# Patient Record
Sex: MALE | Race: WHITE
[De-identification: names, ages, dates, MRNs, and addresses within clinical notes are randomized per-mention and may not be internally consistent; named-entity substitution may affect disease eponyms.]

---

## 2017-03-16 ENCOUNTER — RECORD ABSTRACTING (OUTPATIENT)
Age: 47
End: 2017-03-16

## 2017-03-16 DIAGNOSIS — Z78.9 OTHER SPECIFIED HEALTH STATUS: ICD-10-CM

## 2017-03-16 DIAGNOSIS — Z80.7 FAMILY HISTORY OF OTHER MALIGNANT NEOPLASMS OF LYMPHOID, HEMATOPOIETIC AND RELATED TISSUES: ICD-10-CM

## 2017-03-16 RX ORDER — CLONAZEPAM 1 MG/1
1 TABLET ORAL
Refills: 0 | Status: ACTIVE | COMMUNITY

## 2017-03-30 ENCOUNTER — APPOINTMENT (OUTPATIENT)
Dept: INTERNAL MEDICINE | Facility: CLINIC | Age: 47
End: 2017-03-30

## 2017-03-30 VITALS
DIASTOLIC BLOOD PRESSURE: 80 MMHG | BODY MASS INDEX: 26.07 KG/M2 | SYSTOLIC BLOOD PRESSURE: 104 MMHG | WEIGHT: 176 LBS | HEIGHT: 69 IN | OXYGEN SATURATION: 98 % | HEART RATE: 70 BPM | TEMPERATURE: 98.2 F | RESPIRATION RATE: 16 BRPM

## 2017-03-30 DIAGNOSIS — E27.40 UNSPECIFIED ADRENOCORTICAL INSUFFICIENCY: ICD-10-CM

## 2017-03-30 DIAGNOSIS — Z86.39 PERSONAL HISTORY OF OTHER ENDOCRINE, NUTRITIONAL AND METABOLIC DISEASE: ICD-10-CM

## 2017-09-20 ENCOUNTER — APPOINTMENT (OUTPATIENT)
Dept: INTERNAL MEDICINE | Facility: CLINIC | Age: 47
End: 2017-09-20

## 2017-10-27 ENCOUNTER — MEDICATION RENEWAL (OUTPATIENT)
Age: 47
End: 2017-10-27

## 2018-01-22 ENCOUNTER — MEDICATION RENEWAL (OUTPATIENT)
Age: 48
End: 2018-01-22

## 2018-06-18 ENCOUNTER — RX RENEWAL (OUTPATIENT)
Age: 48
End: 2018-06-18

## 2018-06-18 ENCOUNTER — APPOINTMENT (OUTPATIENT)
Dept: INTERNAL MEDICINE | Facility: CLINIC | Age: 48
End: 2018-06-18
Payer: COMMERCIAL

## 2018-06-18 VITALS
BODY MASS INDEX: 25.18 KG/M2 | RESPIRATION RATE: 16 BRPM | WEIGHT: 170 LBS | HEIGHT: 69 IN | SYSTOLIC BLOOD PRESSURE: 120 MMHG | DIASTOLIC BLOOD PRESSURE: 82 MMHG | OXYGEN SATURATION: 98 % | TEMPERATURE: 98.5 F | HEART RATE: 96 BPM

## 2018-06-18 PROCEDURE — 99214 OFFICE O/P EST MOD 30 MIN: CPT

## 2018-06-18 RX ORDER — ESCITALOPRAM OXALATE 10 MG/1
10 TABLET, FILM COATED ORAL DAILY
Refills: 0 | Status: ACTIVE | COMMUNITY
Start: 2018-06-18

## 2018-06-18 RX ORDER — CLONAZEPAM 1 MG/1
1 TABLET ORAL
Qty: 30 | Refills: 0 | Status: ACTIVE | COMMUNITY
Start: 2018-06-18 | End: 1900-01-01

## 2018-06-18 NOTE — PHYSICAL EXAM
[No Acute Distress] : no acute distress [Well Nourished] : well nourished [Well Developed] : well developed [Normal Outer Ear/Nose] : the outer ears and nose were normal in appearance [Normal Oropharynx] : the oropharynx was normal [Supple] : supple [No Lymphadenopathy] : no lymphadenopathy [No Respiratory Distress] : no respiratory distress  [Clear to Auscultation] : lungs were clear to auscultation bilaterally [No Accessory Muscle Use] : no accessory muscle use [Normal Rate] : normal rate  [Regular Rhythm] : with a regular rhythm [Normal S1, S2] : normal S1 and S2 [Soft] : abdomen soft [Non Tender] : non-tender [Normal Bowel Sounds] : normal bowel sounds

## 2018-06-18 NOTE — HISTORY OF PRESENT ILLNESS
[FreeTextEntry1] : med Renewal [de-identified] : Last seen in this office 15 months ago. Followed by Dr. Acuna for anxiety and depression.  Unfortunately he no longer accepts his insurance and visit here recommended for med renewal.  His current  medication regimine he has taken for over a year.  He takes klonopin prn for increased anxiety 2-3 timesa week.  Ambien he reports he does not take nightly.    Overall he has been feeling well and feels he is able to manage his anxiety and depression.  He exercises on a daily basis.  He denies any suicidal thought or plans.  He does see a therapist weekly as well.    Leaving for vacation to [place] next week and requires meds. \par

## 2018-06-18 NOTE — HISTORY OF PRESENT ILLNESS
[FreeTextEntry1] : med Renewal [de-identified] : Last seen in this office 15 months ago. Followed by Dr. Acuna for anxiety and depression.  Unfortunately he no longer accepts his insurance and visit here recommended for med renewal.  His current  medication regimine he has taken for over a year.  He takes klonopin prn for increased anxiety 2-3 timesa week.  Ambien he reports he does not take nightly.    Overall he has been feeling well and feels he is able to manage his anxiety and depression.  He exercises on a daily basis.  He denies any suicidal thought or plans.  He does see a therapist weekly as well.    Leaving for vacation to [place] next week and requires meds. \par

## 2018-06-18 NOTE — ASSESSMENT
[FreeTextEntry1] : \par Discussed with pt that I would be able to refill his medications at this time however he will need to follow up with a  psychiatrist or psych NP for further medication management due to his complex regimine.  \par During [place] of  [place]  [place] listed from another prescriber.  Pt did not disclose this.  \par He obtains this for additional tx of anxiety and also for hip pain.  \par \par Pt will contact [facility] Clinical coordinator to assist him finding a new psychiatrist as well as Endocrinologist.  . \par \par FU with his therapist.\par \par Schedule CPE.

## 2018-06-18 NOTE — HISTORY OF PRESENT ILLNESS
[FreeTextEntry1] : med Renewal [de-identified] : Last seen in this office 15 months ago. Followed by Dr. Acuna for anxiety and depression.  Unfortunately he no longer accepts his insurance and visit here recommended for med renewal.  His current  medication regimine he has taken for over a year.  He takes klonopin prn for increased anxiety 2-3 timesa week.  Ambien he reports he does not take nightly.    Overall he has been feeling well and feels he is able to manage his anxiety and depression.  He exercises on a daily basis.  He denies any suicidal thought or plans.  He does see a therapist weekly as well.    Leaving for vacation to [place] next week and requires meds. \par

## 2018-06-18 NOTE — ASSESSMENT
[FreeTextEntry1] : \par Discussed with pt that I would be able to refill his medications at this time however he will need to follow up with a  psychiatrist or psych NP for further medication management due to his complex regimine.  \par During [place] of  [place]  [place]ed [place] listed from another prescriber.  Pt did not disclose this.  \par He obtains this for additional tx of anxiety and also for hip pain.  \par \par Pt will contact [facility] Clinical coordinator to assist him finding a new psychiatrist as well as Endocrinologist.  . \par \par FU with his therapist.\par \par Schedule CPE.

## 2018-06-18 NOTE — ASSESSMENT
[FreeTextEntry1] : \par Discussed with pt that I would be able to refill his medications at this time however he will need to follow up with a  psychiatrist or psych NP for further medication management due to his complex regimine.  \par During [place] of  [place]  [place]ed [place] listed from another prescriber.  Pt did not disclose this.  \par He obtains this for additional tx of anxiety and also for hip pain.  \par \par Pt will contact [facility] Clinical coordinator to assist him finding a new psychiatrist as well as Endocrinologist.  . \par \par FU with his therapist.\par \par Schedule CPE.

## 2018-08-03 ENCOUNTER — RX RENEWAL (OUTPATIENT)
Age: 48
End: 2018-08-03

## 2018-08-08 ENCOUNTER — APPOINTMENT (OUTPATIENT)
Dept: INTERNAL MEDICINE | Facility: CLINIC | Age: 48
End: 2018-08-08
Payer: COMMERCIAL

## 2018-08-08 ENCOUNTER — MEDICATION RENEWAL (OUTPATIENT)
Age: 48
End: 2018-08-08

## 2018-08-08 VITALS
DIASTOLIC BLOOD PRESSURE: 82 MMHG | SYSTOLIC BLOOD PRESSURE: 120 MMHG | BODY MASS INDEX: 25.33 KG/M2 | WEIGHT: 171 LBS | OXYGEN SATURATION: 98 % | RESPIRATION RATE: 16 BRPM | HEIGHT: 69 IN | HEART RATE: 76 BPM | TEMPERATURE: 98.4 F

## 2018-08-08 DIAGNOSIS — F41.9 ANXIETY DISORDER, UNSPECIFIED: ICD-10-CM

## 2018-08-08 DIAGNOSIS — G47.00 INSOMNIA, UNSPECIFIED: ICD-10-CM

## 2018-08-08 DIAGNOSIS — F32.9 MAJOR DEPRESSIVE DISORDER, SINGLE EPISODE, UNSPECIFIED: ICD-10-CM

## 2018-08-08 PROCEDURE — 99396 PREV VISIT EST AGE 40-64: CPT

## 2018-08-08 RX ORDER — QUETIAPINE FUMARATE 25 MG/1
25 TABLET ORAL
Qty: 30 | Refills: 0 | Status: ACTIVE | COMMUNITY
Start: 2018-06-18 | End: 1900-01-01

## 2018-08-08 RX ORDER — LIOTHYRONINE SODIUM 25 UG/1
25 TABLET ORAL DAILY
Qty: 30 | Refills: 5 | Status: ACTIVE | COMMUNITY
Start: 2017-10-27 | End: 1900-01-01

## 2018-08-08 RX ORDER — LEVOTHYROXINE SODIUM 0.03 MG/1
25 TABLET ORAL
Qty: 30 | Refills: 5 | Status: ACTIVE | COMMUNITY
Start: 2017-10-27 | End: 1900-01-01

## 2018-08-08 RX ORDER — ZOLPIDEM TARTRATE 10 MG/1
10 TABLET ORAL
Qty: 15 | Refills: 0 | Status: ACTIVE | COMMUNITY
Start: 2018-06-18 | End: 1900-01-01

## 2018-08-08 NOTE — HEALTH RISK ASSESSMENT
[Very Good] : ~his/her~ current health as very good [Good] : ~his/her~  mood as  good [No falls in past year] : Patient reported no falls in the past year [0] : 2) Feeling down, depressed, or hopeless: Not at all (0) [None] : None [Alone] : lives alone [Employed] : employed [College] : College [Single] : single [Sexually Active] : sexually active [Feels Safe at Home] : Feels safe at home [Fully functional (bathing, dressing, toileting, transferring, walking, feeding)] : Fully functional (bathing, dressing, toileting, transferring, walking, feeding) [Fully functional (using the telephone, shopping, preparing meals, housekeeping, doing laundry, using] : Fully functional and needs no help or supervision to perform IADLs (using the telephone, shopping, preparing meals, housekeeping, doing laundry, using transportation, managing medications and managing finances) [Smoke Detector] : smoke detector [Carbon Monoxide Detector] : carbon monoxide detector [Seat Belt] :  uses seat belt [Sunscreen] : uses sunscreen [Travel to Developing Areas] : travel to developing areas [] : No [de-identified] : no [de-identified] : no [de-identified] : chewing  [Change in mental status noted] : No change in mental status noted [Language] : denies difficulty with language [Behavior] : denies difficulty with behavior [Learning/Retaining New Information] : denies difficulty learning/retaining new information [Handling Complex Tasks] : denies difficulty handling complex tasks [Reasoning] : denies difficulty with reasoning [Spatial Ability and Orientation] : denies difficulty with spatial ability and orientation [High Risk Behavior] : no high risk behavior [Reports changes in hearing] : Reports no changes in hearing [Reports changes in vision] : Reports no changes in vision [Reports changes in dental health] : Reports no changes in dental health [Guns at Home] : no guns at home [Safety elements used in home] : no safety elements used in home [TB Exposure] : is not being exposed to tuberculosis [Caregiver Concerns] : does not have caregiver concerns [ColonoscopyComments] : no

## 2018-08-08 NOTE — COUNSELING
[Weight management counseling provided] : Weight management [Healthy eating counseling provided] : healthy eating [Activity counseling provided] : activity [___ min/wk activity recommended] : [unfilled] min/wk activity recommended [Financial issues] : Financial issues [Other: ____] : [unfilled]

## 2018-08-08 NOTE — PLAN
[FreeTextEntry1] : \par Continue on current medications.  I have again explained need for FU with PsychRoxanne.  He is still attempting to secure appts.  \par \par Continue to exercise.  \par \par Fu with psychotherapy\par \par Dental and Vision exams advised.  \par

## 2018-09-04 ENCOUNTER — RX RENEWAL (OUTPATIENT)
Age: 48
End: 2018-09-04

## 2018-10-04 ENCOUNTER — RX RENEWAL (OUTPATIENT)
Age: 48
End: 2018-10-04

## 2018-10-05 ENCOUNTER — RX RENEWAL (OUTPATIENT)
Age: 48
End: 2018-10-05

## 2018-11-02 ENCOUNTER — RX RENEWAL (OUTPATIENT)
Age: 48
End: 2018-11-02

## 2018-12-05 ENCOUNTER — MEDICATION RENEWAL (OUTPATIENT)
Age: 48
End: 2018-12-05

## 2019-03-27 ENCOUNTER — RX RENEWAL (OUTPATIENT)
Age: 49
End: 2019-03-27

## 2019-03-27 RX ORDER — LAMOTRIGINE 150 MG/1
150 TABLET ORAL
Qty: 30 | Refills: 0 | Status: ACTIVE | COMMUNITY
Start: 2018-08-03 | End: 1900-01-01

## 2020-09-21 ENCOUNTER — TRANSCRIPTION ENCOUNTER (OUTPATIENT)
Age: 50
End: 2020-09-21

## 2021-02-08 ENCOUNTER — TRANSCRIPTION ENCOUNTER (OUTPATIENT)
Age: 51
End: 2021-02-08

## 2021-06-01 ENCOUNTER — TRANSCRIPTION ENCOUNTER (OUTPATIENT)
Age: 51
End: 2021-06-01

## 2021-08-28 ENCOUNTER — TRANSCRIPTION ENCOUNTER (OUTPATIENT)
Age: 51
End: 2021-08-28

## 2021-09-12 ENCOUNTER — TRANSCRIPTION ENCOUNTER (OUTPATIENT)
Age: 51
End: 2021-09-12

## 2022-02-15 ENCOUNTER — APPOINTMENT (OUTPATIENT)
Dept: DERMATOLOGY | Facility: CLINIC | Age: 52
End: 2022-02-15
Payer: COMMERCIAL

## 2022-02-15 ENCOUNTER — NON-APPOINTMENT (OUTPATIENT)
Age: 52
End: 2022-02-15

## 2022-02-15 DIAGNOSIS — L57.0 ACTINIC KERATOSIS: ICD-10-CM

## 2022-02-15 DIAGNOSIS — L82.1 OTHER SEBORRHEIC KERATOSIS: ICD-10-CM

## 2022-02-15 DIAGNOSIS — L81.4 OTHER MELANIN HYPERPIGMENTATION: ICD-10-CM

## 2022-02-15 DIAGNOSIS — L98.9 DISORDER OF THE SKIN AND SUBCUTANEOUS TISSUE, UNSPECIFIED: ICD-10-CM

## 2022-02-15 DIAGNOSIS — Z12.83 ENCOUNTER FOR SCREENING FOR MALIGNANT NEOPLASM OF SKIN: ICD-10-CM

## 2022-02-15 DIAGNOSIS — D22.9 MELANOCYTIC NEVI, UNSPECIFIED: ICD-10-CM

## 2022-02-15 PROCEDURE — 99204 OFFICE O/P NEW MOD 45 MIN: CPT | Mod: 25

## 2022-02-15 PROCEDURE — 17003 DESTRUCT PREMALG LES 2-14: CPT

## 2022-02-15 PROCEDURE — 17000 DESTRUCT PREMALG LESION: CPT

## 2022-02-15 NOTE — HISTORY OF PRESENT ILLNESS
[FreeTextEntry1] : skin check [de-identified] : 51 year old male here for skin check. has erosion in left buccal mucosa x 5 weeks. improving per patient. hx of chewing tobacco.\par

## 2022-04-14 ENCOUNTER — APPOINTMENT (OUTPATIENT)
Dept: OTOLARYNGOLOGY | Facility: CLINIC | Age: 52
End: 2022-04-14
Payer: COMMERCIAL

## 2022-04-14 VITALS — WEIGHT: 165 LBS | BODY MASS INDEX: 24.44 KG/M2 | TEMPERATURE: 97.1 F | HEIGHT: 69 IN

## 2022-04-14 DIAGNOSIS — J38.7 OTHER DISEASES OF LARYNX: ICD-10-CM

## 2022-04-14 PROCEDURE — 99204 OFFICE O/P NEW MOD 45 MIN: CPT | Mod: 25

## 2022-04-14 PROCEDURE — 31575 DIAGNOSTIC LARYNGOSCOPY: CPT

## 2022-04-14 NOTE — ASSESSMENT
[FreeTextEntry1] : Patient with lesion of buccal mucosa on left - posteriorly near lower molars .  Asymptomatic.  Patient has had lesion for several mos and ? may be decreasing in size.  Has seen oral surgeon who reasurred patient and felt it may be fibroma or other benign lesion - had discussion about removal vs conserv care.\par Findings today do show 2 -3 mm lesion left posterior buccal mucosa as described .  Feel benign fibroma or mucocoele.  Discussed removal (bx) in office or observation . Patient stated he would like it removed - will schedule after pre approval - r and a discussed.   If not removed would re eval in 2-3 mos.  (procedure cpt code for bx of vestibule of mouth 06554)\par Also larynx evaluated and no lesion seen as throat cancer screen

## 2022-04-14 NOTE — PHYSICAL EXAM
[Midline] : trachea located in midline position [Normal] : no rashes [de-identified] : lesion posterior buccal mucosa just below molar left side of mouth  - sl white, smooth , non tender  approx 2-3mm

## 2022-04-14 NOTE — HISTORY OF PRESENT ILLNESS
[de-identified] : Noted sore in mouth - gum area - noted approx 4 mos ago.  Did see dentist - and also oral surgeon - felt fibroma.  No sx.  Hx of chewing tobacco.  - saw catherineson and now no longer using tobacco.  No other sx.  NO eating issues or breathing issues.  Lesion has been gradually improving \par Patient also with concerns about throat cancer

## 2022-05-04 ENCOUNTER — APPOINTMENT (OUTPATIENT)
Dept: OTOLARYNGOLOGY | Facility: CLINIC | Age: 52
End: 2022-05-04
Payer: COMMERCIAL

## 2022-05-04 VITALS
SYSTOLIC BLOOD PRESSURE: 128 MMHG | TEMPERATURE: 97.5 F | HEART RATE: 76 BPM | BODY MASS INDEX: 24.44 KG/M2 | WEIGHT: 165 LBS | DIASTOLIC BLOOD PRESSURE: 87 MMHG | HEIGHT: 69 IN

## 2022-05-04 PROCEDURE — 40808 BIOPSY OF MOUTH LESION: CPT

## 2022-05-04 PROCEDURE — 99213 OFFICE O/P EST LOW 20 MIN: CPT | Mod: 25

## 2022-05-04 NOTE — PHYSICAL EXAM
[Midline] : trachea located in midline position [Normal] : no rashes [de-identified] : lesion posterior buccal mucosa just below molar left side of mouth  - sl white, smooth , non tender  approx 2-3mm

## 2022-05-04 NOTE — PROCEDURE
[FreeTextEntry1] : excisional bx lesion of left buccal mucosa  [FreeTextEntry2] : Non resolving lesion of left buccal mucosa  [FreeTextEntry3] : Area anesthetized with hurricane spray and then injected with approx 1cc of 2% lidocaine with 1:100,000 epi.   Lesion removed with cup forceps - lesion left buccal mucosa - - approx 2 mm in size - white and raised  - removed with minimal bleeding less than 1-2 cc and bleeding controlled with AgNO3 - specimen sent in formalin -  patient letha procedure well  - no complications

## 2022-05-04 NOTE — ASSESSMENT
[FreeTextEntry1] : Lesion of buccal mucosa removed - 2 mm lesion of left buccal mucosa lateral to left lower molar.  recommended rinses as necessary and started on amox 500 bid for 5 days. follow up in 1-2 weeks and as necessary

## 2022-05-04 NOTE — HISTORY OF PRESENT ILLNESS
[de-identified] : Patient with  lesion of left buccal mucosa - lateral to left lower molar - no change for extended period - likely fibroma - patient here for bx/removal-  all r and a discussed including infection, bleeding, pain, and recurrence and possible need for further procedures.

## 2022-05-06 ENCOUNTER — NON-APPOINTMENT (OUTPATIENT)
Age: 52
End: 2022-05-06

## 2022-05-06 LAB — CORE LAB BIOPSY: NORMAL

## 2022-05-07 ENCOUNTER — NON-APPOINTMENT (OUTPATIENT)
Age: 52
End: 2022-05-07

## 2022-05-11 ENCOUNTER — APPOINTMENT (OUTPATIENT)
Dept: OTOLARYNGOLOGY | Facility: CLINIC | Age: 52
End: 2022-05-11
Payer: COMMERCIAL

## 2022-05-11 DIAGNOSIS — K13.70 UNSPECIFIED LESIONS OF ORAL MUCOSA: ICD-10-CM

## 2022-05-11 PROCEDURE — 99024 POSTOP FOLLOW-UP VISIT: CPT

## 2022-05-11 NOTE — HISTORY OF PRESENT ILLNESS
[de-identified] : Patient s/p excision/bx of lesion of left posterior buccal mucosa.  No sx now and path negative Procedure done 5/4/2022

## 2022-05-11 NOTE — ASSESSMENT
[FreeTextEntry1] : Patient here for eval after exc/bx lesion of left buccal mucosa  5/4/2022 - doing well. Minimal discomfort now.  Healing nicely .  Path negative. - Recommend finish abx and follow up as necessary

## 2022-05-11 NOTE — PHYSICAL EXAM
[Midline] : trachea located in midline position [Normal] : no rashes [de-identified] : healing area of posterior buccal mucosa just below molar left side of mouth  - no escar - sl whitish area where healing.

## 2023-03-06 ENCOUNTER — APPOINTMENT (OUTPATIENT)
Dept: GASTROENTEROLOGY | Facility: CLINIC | Age: 53
End: 2023-03-06
Payer: COMMERCIAL

## 2023-03-06 VITALS
BODY MASS INDEX: 24.88 KG/M2 | HEART RATE: 78 BPM | DIASTOLIC BLOOD PRESSURE: 92 MMHG | HEIGHT: 69 IN | WEIGHT: 168 LBS | SYSTOLIC BLOOD PRESSURE: 136 MMHG

## 2023-03-06 DIAGNOSIS — Z12.11 ENCOUNTER FOR SCREENING FOR MALIGNANT NEOPLASM OF COLON: ICD-10-CM

## 2023-03-06 PROCEDURE — 99203 OFFICE O/P NEW LOW 30 MIN: CPT

## 2023-03-09 NOTE — HISTORY OF PRESENT ILLNESS
[FreeTextEntry1] : 52 M  presenting for initial screening colonoscopy eval. Currently has no complaints, and denies chest pain, shortness of breath, nausea, vomiting, abdominal pain, diarrhea, constipation, melena, hematochezia, unintentional weight changes, fevers, chills. No family hx of GI disease. Father had multiple myeloma, and sister had breast cancer.

## 2023-03-09 NOTE — ASSESSMENT
[FreeTextEntry1] : 52 M  presenting for initial screening colonoscopy eval. Currently has no complaints. Will plan for colonoscopy. Discussed with patient prep, procedure, and risks such as missed lesions/polyps, bleeding, and perforation of the bowel. Verbalized understanding. Prep sent to pharmacy. Discussed with Dr. Martin.

## 2023-06-12 ENCOUNTER — APPOINTMENT (OUTPATIENT)
Dept: GASTROENTEROLOGY | Facility: AMBULATORY MEDICAL SERVICES | Age: 53
End: 2023-06-12

## 2023-12-24 ENCOUNTER — NON-APPOINTMENT (OUTPATIENT)
Age: 53
End: 2023-12-24

## 2024-01-03 ENCOUNTER — NON-APPOINTMENT (OUTPATIENT)
Age: 54
End: 2024-01-03

## 2024-01-05 ENCOUNTER — APPOINTMENT (OUTPATIENT)
Dept: INTERNAL MEDICINE | Facility: CLINIC | Age: 54
End: 2024-01-05
Payer: COMMERCIAL

## 2024-01-05 ENCOUNTER — NON-APPOINTMENT (OUTPATIENT)
Age: 54
End: 2024-01-05

## 2024-01-05 VITALS
RESPIRATION RATE: 16 BRPM | BODY MASS INDEX: 26.66 KG/M2 | WEIGHT: 180 LBS | HEART RATE: 85 BPM | SYSTOLIC BLOOD PRESSURE: 130 MMHG | DIASTOLIC BLOOD PRESSURE: 90 MMHG | OXYGEN SATURATION: 97 % | TEMPERATURE: 98.3 F | HEIGHT: 69 IN

## 2024-01-05 DIAGNOSIS — J01.90 ACUTE SINUSITIS, UNSPECIFIED: ICD-10-CM

## 2024-01-05 DIAGNOSIS — J45.30 MILD PERSISTENT ASTHMA, UNCOMPLICATED: ICD-10-CM

## 2024-01-05 DIAGNOSIS — R53.83 OTHER FATIGUE: ICD-10-CM

## 2024-01-05 DIAGNOSIS — B34.9 VIRAL INFECTION, UNSPECIFIED: ICD-10-CM

## 2024-01-05 DIAGNOSIS — E03.9 HYPOTHYROIDISM, UNSPECIFIED: ICD-10-CM

## 2024-01-05 DIAGNOSIS — Z00.00 ENCOUNTER FOR GENERAL ADULT MEDICAL EXAMINATION W/OUT ABNORMAL FINDINGS: ICD-10-CM

## 2024-01-05 PROCEDURE — 99205 OFFICE O/P NEW HI 60 MIN: CPT

## 2024-01-05 RX ORDER — AMOXICILLIN AND CLAVULANATE POTASSIUM 875; 125 MG/1; MG/1
875-125 TABLET, COATED ORAL
Refills: 0 | Status: ACTIVE | COMMUNITY

## 2024-01-05 RX ORDER — AMOXICILLIN 500 MG/1
500 TABLET, FILM COATED ORAL
Qty: 10 | Refills: 0 | Status: DISCONTINUED | COMMUNITY
Start: 2022-05-04 | End: 2024-01-05

## 2024-01-05 RX ORDER — METHYLPREDNISOLONE 4 MG/1
4 TABLET ORAL
Refills: 0 | Status: ACTIVE | COMMUNITY

## 2024-01-05 NOTE — PHYSICAL EXAM
[No Acute Distress] : no acute distress [Well Nourished] : well nourished [Well Developed] : well developed [Well-Appearing] : well-appearing [Normal Sclera/Conjunctiva] : normal sclera/conjunctiva [EOMI] : extraocular movements intact [Normal Outer Ear/Nose] : the outer ears and nose were normal in appearance [No JVD] : no jugular venous distention [Supple] : supple [No Respiratory Distress] : no respiratory distress  [No Accessory Muscle Use] : no accessory muscle use [Clear to Auscultation] : lungs were clear to auscultation bilaterally [Normal Rate] : normal rate  [Regular Rhythm] : with a regular rhythm [Normal S1, S2] : normal S1 and S2 [No Murmur] : no murmur heard [No Edema] : there was no peripheral edema [Soft] : abdomen soft [Non Tender] : non-tender [Non-distended] : non-distended [No Masses] : no abdominal mass palpated [No HSM] : no HSM [Normal Bowel Sounds] : normal bowel sounds [Normal Supraclavicular Nodes] : no supraclavicular lymphadenopathy [Normal Posterior Cervical Nodes] : no posterior cervical lymphadenopathy [Normal Anterior Cervical Nodes] : no anterior cervical lymphadenopathy [No CVA Tenderness] : no CVA  tenderness [No Rash] : no rash [Coordination Grossly Intact] : coordination grossly intact [Normal Gait] : normal gait [Normal Affect] : the affect was normal [Normal Insight/Judgement] : insight and judgment were intact [de-identified] : There is tenderness on palpation over the maxillary and frontal sinus regions. [de-identified] : There is narrowing and crowding of the posterior pharynx.  Mild erythema is noted without exudate.  The nasal mucosa is without purulent exudate.

## 2024-01-05 NOTE — HISTORY OF PRESENT ILLNESS
[FreeTextEntry1] : The patient comes in for an evaluation regarding flu-like symptoms.  [de-identified] : Mr. CORDOBA is a 53 year male with a history of hypothyroidism, anxiety, and depression.  He was last seen by myself, in 2018.    He presents today for an evaluation of flu-like symptoms that have been present since mid-December. The symptoms progressively worsened over the week of December 17th, and he reported to the urgent care on December 25th.  At that time, he tested negative for COVID and flu and was advised to begin taking tylenol and motrin. His symptoms did not improve with tylenol and motrin use. He once again reported to the urgent care yesterday due to increased facial pressure and headaches.  He was prescribed a medrol dose pack and augmentin 875 mg/day for 10 days. The patient does not have any purulent nasal secretions or hemoptysis. Of note, the patient did not receive a flu vaccination this year.  The patient has a history of underlying anxiety and depression, for which he follows with a psychiatric nurse practitioner, Tamiko Garcia. He remains on Lexapro 5 mg/day, Lamictal 150 mg daily and Seroquel 12.5 mg/day.  He denies any recent changes to his mental health, and has noted improvement with the use of his medications.   The patient additionally has a history of hypothyroidism. He has been compliant in taking the LT4 daily, away from food or any medication that may inhibit absorption. He has tolerated this medication well without any apparent adverse effects.  He  denies any temperature intolerance, significant weight changes, or severe fatigue. He  in addition denies any palpitations, tremors, anxiousness, change in bowel habits or significant change in moods.   The patient has not yet had a colonoscopy.  He states that he is scheduled to have the procedure performed as a baseline, next month.  He denies any recent changes to bowel habits.There have been no other acute constitutional symptoms. He comes in for this assessment.

## 2024-01-05 NOTE — PLAN
[FreeTextEntry1] : 1. Continue current medications as outlined above.   2. Follow up in 6 months with wellness visit and routine blood work   3. Maintain exercise regimen as tolerated.  4. Patient will begin Wei sinus rinse BiD with salt, baking soda, and budesonide.  If his symptoms improve, he can reduce this down to a once a day regimen.  If the symptoms are completely resolved, he can then discontinue.  5. Patient will continue 10 day course of augmentin 875 mg twice daily, and the Medrol Dosepak, as ordered by the urgent care center on 1/4/2024.

## 2024-01-05 NOTE — REVIEW OF SYSTEMS
[Fatigue] : fatigue [Negative] : Heme/Lymph [FreeTextEntry2] : see history of present illness [FreeTextEntry4] : see history of present illness

## 2024-01-24 RX ORDER — BUDESONIDE 0.5 MG/2ML
0.5 INHALANT ORAL TWICE DAILY
Qty: 2 | Refills: 11 | Status: ACTIVE | COMMUNITY
Start: 2024-01-05 | End: 1900-01-01

## 2024-03-12 ENCOUNTER — APPOINTMENT (OUTPATIENT)
Dept: GASTROENTEROLOGY | Facility: AMBULATORY MEDICAL SERVICES | Age: 54
End: 2024-03-12

## 2024-09-10 ENCOUNTER — APPOINTMENT (OUTPATIENT)
Dept: INTERNAL MEDICINE | Facility: CLINIC | Age: 54
End: 2024-09-10

## 2025-03-06 ENCOUNTER — APPOINTMENT (OUTPATIENT)
Dept: INTERNAL MEDICINE | Facility: CLINIC | Age: 55
End: 2025-03-06
Payer: MEDICAID

## 2025-03-06 VITALS
HEART RATE: 88 BPM | TEMPERATURE: 98.8 F | RESPIRATION RATE: 15 BRPM | HEIGHT: 69 IN | WEIGHT: 174 LBS | OXYGEN SATURATION: 96 % | DIASTOLIC BLOOD PRESSURE: 100 MMHG | SYSTOLIC BLOOD PRESSURE: 130 MMHG | BODY MASS INDEX: 25.77 KG/M2

## 2025-03-06 DIAGNOSIS — M25.552 PAIN IN LEFT HIP: ICD-10-CM

## 2025-03-06 DIAGNOSIS — I10 ESSENTIAL (PRIMARY) HYPERTENSION: ICD-10-CM

## 2025-03-06 PROCEDURE — 99203 OFFICE O/P NEW LOW 30 MIN: CPT

## 2025-03-06 RX ORDER — METHYLPREDNISOLONE 4 MG/1
4 TABLET ORAL
Qty: 1 | Refills: 0 | Status: ACTIVE | COMMUNITY
Start: 2025-03-06 | End: 1900-01-01

## 2025-03-07 ENCOUNTER — APPOINTMENT (OUTPATIENT)
Facility: CLINIC | Age: 55
End: 2025-03-07
Payer: MEDICAID

## 2025-03-07 VITALS — BODY MASS INDEX: 25.77 KG/M2 | HEIGHT: 69 IN | WEIGHT: 174 LBS

## 2025-03-07 PROCEDURE — 99204 OFFICE O/P NEW MOD 45 MIN: CPT

## 2025-03-07 PROCEDURE — 73503 X-RAY EXAM HIP UNI 4/> VIEWS: CPT | Mod: LT

## 2025-03-08 ENCOUNTER — TRANSCRIPTION ENCOUNTER (OUTPATIENT)
Age: 55
End: 2025-03-08

## 2025-03-10 ENCOUNTER — APPOINTMENT (OUTPATIENT)
Dept: ORTHOPEDIC SURGERY | Facility: CLINIC | Age: 55
End: 2025-03-10
Payer: MEDICAID

## 2025-03-10 ENCOUNTER — NON-APPOINTMENT (OUTPATIENT)
Age: 55
End: 2025-03-10

## 2025-03-10 VITALS
DIASTOLIC BLOOD PRESSURE: 93 MMHG | HEART RATE: 83 BPM | SYSTOLIC BLOOD PRESSURE: 132 MMHG | HEIGHT: 69 IN | BODY MASS INDEX: 25.92 KG/M2 | WEIGHT: 175 LBS

## 2025-03-10 DIAGNOSIS — M16.12 UNILATERAL PRIMARY OSTEOARTHRITIS, LEFT HIP: ICD-10-CM

## 2025-03-10 PROCEDURE — 99204 OFFICE O/P NEW MOD 45 MIN: CPT

## 2025-03-10 RX ORDER — MELOXICAM 15 MG/1
15 TABLET ORAL
Qty: 30 | Refills: 0 | Status: ACTIVE | COMMUNITY
Start: 2025-03-10 | End: 1900-01-01

## 2025-03-20 ENCOUNTER — APPOINTMENT (OUTPATIENT)
Dept: INTERNAL MEDICINE | Facility: CLINIC | Age: 55
End: 2025-03-20
Payer: MEDICAID

## 2025-03-20 VITALS
TEMPERATURE: 98.6 F | OXYGEN SATURATION: 97 % | WEIGHT: 187 LBS | SYSTOLIC BLOOD PRESSURE: 140 MMHG | RESPIRATION RATE: 16 BRPM | HEIGHT: 69 IN | DIASTOLIC BLOOD PRESSURE: 90 MMHG | HEART RATE: 80 BPM | BODY MASS INDEX: 27.7 KG/M2

## 2025-03-20 DIAGNOSIS — I10 ESSENTIAL (PRIMARY) HYPERTENSION: ICD-10-CM

## 2025-03-20 PROCEDURE — 99214 OFFICE O/P EST MOD 30 MIN: CPT

## 2025-03-20 RX ORDER — AMLODIPINE BESYLATE 2.5 MG/1
2.5 TABLET ORAL DAILY
Qty: 90 | Refills: 1 | Status: ACTIVE | COMMUNITY
Start: 2025-03-20 | End: 1900-01-01

## 2025-04-11 ENCOUNTER — APPOINTMENT (OUTPATIENT)
Dept: INTERNAL MEDICINE | Facility: CLINIC | Age: 55
End: 2025-04-11